# Patient Record
Sex: FEMALE | Race: OTHER | NOT HISPANIC OR LATINO | URBAN - METROPOLITAN AREA
[De-identification: names, ages, dates, MRNs, and addresses within clinical notes are randomized per-mention and may not be internally consistent; named-entity substitution may affect disease eponyms.]

---

## 2022-05-14 ENCOUNTER — EMERGENCY (EMERGENCY)
Facility: HOSPITAL | Age: 37
LOS: 1 days | Discharge: HOME | End: 2022-05-14
Attending: EMERGENCY MEDICINE | Admitting: EMERGENCY MEDICINE
Payer: MEDICAID

## 2022-05-14 VITALS
TEMPERATURE: 98 F | OXYGEN SATURATION: 99 % | RESPIRATION RATE: 16 BRPM | DIASTOLIC BLOOD PRESSURE: 76 MMHG | SYSTOLIC BLOOD PRESSURE: 115 MMHG | HEART RATE: 96 BPM

## 2022-05-14 PROCEDURE — 99283 EMERGENCY DEPT VISIT LOW MDM: CPT

## 2022-05-14 RX ORDER — IBUPROFEN 200 MG
600 TABLET ORAL ONCE
Refills: 0 | Status: COMPLETED | OUTPATIENT
Start: 2022-05-14 | End: 2022-05-14

## 2022-05-14 RX ORDER — ACETAMINOPHEN 500 MG
650 TABLET ORAL ONCE
Refills: 0 | Status: COMPLETED | OUTPATIENT
Start: 2022-05-14 | End: 2022-05-14

## 2022-05-14 RX ADMIN — Medication 650 MILLIGRAM(S): at 16:31

## 2022-05-14 RX ADMIN — Medication 600 MILLIGRAM(S): at 16:31

## 2022-05-14 NOTE — ED PROVIDER NOTE - NSFOLLOWUPINSTRUCTIONS_ED_ALL_ED_FT
Today you were seen in the ED for tooth pain     It was found that you have an impacted wisdom tooth given the history you provided     Please follow up with your dentist, if you are not able to, information for one has been provided below.     Please return to the ED if you have any of the following:    Inability to swallow, you are not able to chew, there is swelling occurring around your cheek [facility]  [facility]  [phone] [place] [address], 1st [address]  [place] [id number] [phone]    Toothache    WHAT YOU NEED TO KNOW:    A toothache is pain that is caused by irritation of the nerves in the center of your tooth. The irritation may be caused by several problems, such as a cavity, an infection, a cracked tooth, or gum disease.  Tooth Anatomy    DISCHARGE INSTRUCTIONS:    Return to the emergency department if:    You have trouble breathing or swallowing.    You have swelling in your face or neck.  Contact your dentist if:    You have a fever and chills.    You have trouble opening or closing your mouth.    You have swelling around your tooth.    You have questions or concerns about your condition or care.  Medicines: You may need any of the following:    NSAIDs, such as ibuprofen, help decrease swelling, pain, and fever. This medicine is available with or without a doctor's order. NSAIDs can cause stomach bleeding or kidney problems in certain people. If you take blood thinner medicine, always ask if NSAIDs are safe for you. Always read the medicine label and follow directions. Do not give these medicines to children under 6 months of age without direction from your child's healthcare provider.    Acetaminophen decreases pain and fever. It is available without a doctor's order. Ask how much to take and how often to take it. Follow directions. Acetaminophen can cause liver damage if not taken correctly.    Prescription pain medicine may be given. Ask your healthcare provider how to take this medicine safely. Some prescription pain medicines contain acetaminophen. Do not take other medicines that contain acetaminophen without talking to your healthcare provider. Too much acetaminophen may cause liver damage. Prescription pain medicine may cause constipation. Ask your healthcare provider how to prevent or treat constipation.    Antibiotics help treat or prevent a bacterial infection.    Take your medicine as directed. Contact your healthcare provider if you think your medicine is not helping or if you have side effects. Tell him of her if you are allergic to any medicine. Keep a list of the medicines, vitamins, and herbs you take. Include the amounts, and when and why you take them. Bring the list or the pill bottles to follow-up visits. Carry your medicine list with you in case of an emergency.  Self-care:    Rinse your mouth with warm salt water 4 times a day or as directed.    Eat soft foods to help relieve pain caused by chewing.    Apply ice on your jaw or cheek for 15 to 20 minutes every hour or as directed. Use an ice pack, or put crushed ice in a plastic bag. Cover it with a towel before you apply it. Ice helps prevent tissue damage and decreases swelling and pain.  Help prevent a toothache:    Brush your teeth at least 2 times a day.    Use dental floss to clean between your teeth at least 1 time a day.    See your dentist regularly every 6 months for dental cleanings and oral exams.  Follow up with your dentist as directed: You may be referred to a dental surgeon. Write down your questions so you remember to ask them during your visits.

## 2022-05-14 NOTE — ED ADULT NURSE NOTE - OBJECTIVE STATEMENT
A&Ox4, presents to ED c/o right sided teeth/mouth pain. Respirations are even and unlabored, sating at 100% on RA, medicated as ordered.

## 2022-05-14 NOTE — ED PROVIDER NOTE - CLINICAL SUMMARY MEDICAL DECISION MAKING FREE TEXT BOX
37F w/o PMH CC tooth pain, given hx and physical no actionable / needed interventions in ED pt ultimately requires tooth extraction for resolution of symptoms will provide symptom control

## 2022-05-14 NOTE — ED PROVIDER NOTE - ATTENDING CONTRIBUTION TO CARE
Patient here to have wisdom teeth pulled with no sign of infection or airway compromise  Dental referral given (pt has own dentist)  analgesics given  RTED prn

## 2022-05-14 NOTE — ED PROVIDER NOTE - PATIENT PORTAL LINK FT
You can access the FollowMyHealth Patient Portal offered by [facility] by registering at the following website: [email or web address]. By joining [employer]’s FollowMyHealth portal, you will also be able to view your health information using other applications (apps) compatible with our system.

## 2022-05-14 NOTE — ED ADULT NURSE NOTE - NSFALLRSKASSESSDT_ED_ALL_ED
Visit Information Date & Time Provider Department Dept. Phone Encounter #  
 11/16/2017  9:00 AM Stanislav Garcia, John[facility] [phone] [id number] Follow-up Instructions Return in about 4 months (around 3/16/2018). Upcoming Health Maintenance Date Due  
 FOOT EXAM Q1 9/4/1947 EYE EXAM RETINAL OR DILATED Q1 9/4/1947 DTaP/Tdap/Td series (1 - Tdap) 9/4/1959 MEDICARE YEARLY EXAM 12/31/2017 HEMOGLOBIN A1C Q6M 1/20/2018 MICROALBUMIN Q1 7/20/2018 LIPID PANEL Q1 7/20/2018 GLAUCOMA SCREENING Q2Y 2/6/2019 Allergies as of 11/16/2017  Review Complete On: 11/16/2017 By: Stanislav Garcia MD  
  
 Severity Noted Reaction Type Reactions Ciprofloxacin  07/20/2017    Other (comments) Abdominal pain and bloating Codeine  07/20/2017    Nausea and Vomiting Current Immunizations  Never Reviewed Name Date Pneumococcal Conjugate (PCV-13) 9/5/2016 Pneumococcal Polysaccharide (PPSV-23) 1/9/2012 Zoster Vaccine, Live 9/26/2016 Not reviewed this visit You Were Diagnosed With   
  
 Codes Comments Diabetes mellitus without complication ([facility]    -  Primary ICD-10-CM: E11.9 ICD-9-CM: 250.00 Acquired hypothyroidism     ICD-10-CM: E03.9 ICD-9-CM: 317. 9 Vitals BP Pulse Temp Resp Height(growth percentile) Weight(growth percentile) 115/72 (BP 1 Location: Left arm, BP Patient Position: Sitting) 88 97 °F (36.1 °C) (Oral) 20 5' 0.75\" (1.543 m) 154 lb 12.8 oz (70.2 kg) SpO2 BMI OB Status Smoking Status 93% 29.49 kg/m2 Hysterectomy Never Smoker Vitals History BMI and BSA Data Body Mass Index Body Surface Area  
 29.49 kg/m 2 1.73 m 2 Preferred Pharmacy Pharmacy Name Phone [facility] [phone] Your Updated Medication List  
  
   
This list is accurate as of: 11/16/17  9:15 AM.  Always use your most recent med list.  
  
  
  
  
 glipiZIDE 5 mg tablet Commonly known as:  GLUCOTROL  
TAKE 1 TABLET BY MOUTH TWICE DAILY FOR DIABETES. TAKE WITH MEALS. meclizine 12.5 mg tablet Commonly known as:  ANTIVERT Take 1 Tab by mouth three (3) times daily. metFORMIN 500 mg tablet Commonly known as:  GLUCOPHAGE  
TAKE 1 TABLET BY MOUTH TWICE DAILY WITH FOOD  
  
 omeprazole 20 mg capsule Commonly known as:  PRILOSEC Take 20 mg by mouth daily. SYNTHROID 88 mcg tablet Generic drug:  levothyroxine TAKE 1 TABLET BY MOUTH EVERY MORNING ON AN EMPTY STOMACH ZyrTEC 10 mg Cap Generic drug:  Cetirizine Take  by mouth. Prescriptions Sent to Pharmacy Refills  
 glipiZIDE (GLUCOTROL) 5 mg tablet 3 Sig: TAKE 1 TABLET BY MOUTH TWICE DAILY FOR DIABETES. TAKE WITH MEALS. Class: Normal  
 Pharmacy: [address] [place] [address] #: [phone] Follow-up Instructions Return in about 4 months (around 3/16/2018). To-Do List   
 11/16/2017 Lab:  CBC WITH AUTOMATED DIFF   
  
 11/16/2017 Lab:  HEMOGLOBIN A1C WITH EAG   
  
 11/16/2017 Lab:  METABOLIC PANEL, COMPREHENSIVE   
  
 11/16/2017 Lab:  T4, FREE   
  
 11/16/2017 Lab:  TSH 3RD GENERATION Introducing [facility]! Betty Courtney introduces [employer] patient portal. Now you can access parts of your medical record, email your doctor's office, and request medication refills online. 1. In your internet browser, go to [email or web address] 2. Click on the First Time User? Click Here link in the Sign In box. You will see the New Member Sign Up page. 3. Enter your [employer] Access Code exactly as it appears below. You will not need to use this code after youve completed the sign-up process. If you do not sign up before the expiration date, you must request a new code.  
 
· [employer] Access Code: [email or web address] 
 Expires: 2/14/2018  9:15 AM 
 
4. Enter the last four digits of your Social Security Number (xxxx) and Date of Birth (mm/dd/yyyy) as indicated and click Submit. You will be taken to the next sign-up page. 5. Create a [employer] ID. This will be your [employer] login ID and cannot be changed, so think of one that is secure and easy to remember. 6. Create a [employer] password. You can change your password at any time. 7. Enter your Password Reset Question and Answer. This can be used at a later time if you forget your password. 8. Enter your e-mail address. You will receive e-mail notification when new information is available in [address] 9. Click Sign Up. You can now view and download portions of your medical record. 10. Click the Download Summary menu link to download a portable copy of your medical information. If you have questions, please visit the Frequently Asked Questions section of the [employer] website. Remember, [employer] is NOT to be used for urgent needs. For medical emergencies, dial 911. Now available from your iPhone and Android! Please provide this summary of care documentation to your next provider. Your primary care clinician is listed as Dottie Sood. If you have any questions after today's visit, please call [phone]. 14-May-2022 16:56

## 2022-05-14 NOTE — ED PROVIDER NOTE - PHYSICAL EXAMINATION
GENERAL: Awake, alert, NAD  HEENT: NC/AT, tenderness to right lower jaw, no erythema along gum line, no jaw deviation w/ opening and closing   LUNGS: CTAB, no wheezes or crackles   CARDIAC: RRR, no m/r/g  NEURO: A&Ox3. Moving all extremities.  PSYCH: Normal affect.

## 2022-05-14 NOTE — ED PROVIDER NOTE - OBJECTIVE STATEMENT
37F w/o sig PMH CC toothache of right bottom back. Pt states went to dentist yesterday, was told pt needs wisdom tooth extraction, and is presenting today for an alternative. Pt notes has been having difficulty chewing foods, but is able to swallow, handle secretions, has been taking ibuprofen  200mg every 4 hours w/ minimal relief   LMP within week

## 2022-05-14 NOTE — ED PROVIDER NOTE - PROGRESS NOTE DETAILS
Tawnya PGY 1 consulted  pt on increase in dosage of ibuprofen and told needs dental visit and extraction of tooth for ultimate resolution of symptoms